# Patient Record
Sex: FEMALE | Race: ASIAN | NOT HISPANIC OR LATINO | URBAN - METROPOLITAN AREA
[De-identification: names, ages, dates, MRNs, and addresses within clinical notes are randomized per-mention and may not be internally consistent; named-entity substitution may affect disease eponyms.]

---

## 2020-10-02 ENCOUNTER — INPATIENT (INPATIENT)
Facility: HOSPITAL | Age: 50
LOS: 2 days | Discharge: ANOTHER HOSPITAL | End: 2020-10-05
Attending: HOSPITALIST | Admitting: HOSPITALIST
Payer: COMMERCIAL

## 2020-10-02 VITALS
TEMPERATURE: 98 F | OXYGEN SATURATION: 100 % | HEART RATE: 102 BPM | DIASTOLIC BLOOD PRESSURE: 81 MMHG | SYSTOLIC BLOOD PRESSURE: 124 MMHG | RESPIRATION RATE: 16 BRPM

## 2020-10-02 DIAGNOSIS — F32.2 MAJOR DEPRESSIVE DISORDER, SINGLE EPISODE, SEVERE WITHOUT PSYCHOTIC FEATURES: ICD-10-CM

## 2020-10-02 DIAGNOSIS — F43.20 ADJUSTMENT DISORDER, UNSPECIFIED: ICD-10-CM

## 2020-10-02 DIAGNOSIS — Z29.9 ENCOUNTER FOR PROPHYLACTIC MEASURES, UNSPECIFIED: ICD-10-CM

## 2020-10-02 DIAGNOSIS — T71.162A ASPHYXIATION DUE TO HANGING, INTENTIONAL SELF-HARM, INITIAL ENCOUNTER: ICD-10-CM

## 2020-10-02 LAB
ALBUMIN SERPL ELPH-MCNC: 4.8 G/DL — SIGNIFICANT CHANGE UP (ref 3.3–5)
ALP SERPL-CCNC: 71 U/L — SIGNIFICANT CHANGE UP (ref 40–120)
ALT FLD-CCNC: 41 U/L — HIGH (ref 4–33)
AMPHET UR-MCNC: NEGATIVE — SIGNIFICANT CHANGE UP
ANION GAP SERPL CALC-SCNC: 19 MMO/L — HIGH (ref 7–14)
APAP SERPL-MCNC: < 15 UG/ML — LOW (ref 15–25)
APPEARANCE UR: CLEAR — SIGNIFICANT CHANGE UP
APTT BLD: 26.1 SEC — LOW (ref 27–36.3)
AST SERPL-CCNC: 43 U/L — HIGH (ref 4–32)
BACTERIA # UR AUTO: NEGATIVE — SIGNIFICANT CHANGE UP
BARBITURATES UR SCN-MCNC: NEGATIVE — SIGNIFICANT CHANGE UP
BASOPHILS # BLD AUTO: 0.04 K/UL — SIGNIFICANT CHANGE UP (ref 0–0.2)
BASOPHILS NFR BLD AUTO: 0.6 % — SIGNIFICANT CHANGE UP (ref 0–2)
BENZODIAZ UR-MCNC: NEGATIVE — SIGNIFICANT CHANGE UP
BILIRUB SERPL-MCNC: 0.6 MG/DL — SIGNIFICANT CHANGE UP (ref 0.2–1.2)
BILIRUB UR-MCNC: NEGATIVE — SIGNIFICANT CHANGE UP
BLD GP AB SCN SERPL QL: NEGATIVE — SIGNIFICANT CHANGE UP
BLOOD UR QL VISUAL: NEGATIVE — SIGNIFICANT CHANGE UP
BUN SERPL-MCNC: 21 MG/DL — SIGNIFICANT CHANGE UP (ref 7–23)
CALCIUM SERPL-MCNC: 9.7 MG/DL — SIGNIFICANT CHANGE UP (ref 8.4–10.5)
CANNABINOIDS UR-MCNC: NEGATIVE — SIGNIFICANT CHANGE UP
CHLORIDE SERPL-SCNC: 98 MMOL/L — SIGNIFICANT CHANGE UP (ref 98–107)
CO2 SERPL-SCNC: 23 MMOL/L — SIGNIFICANT CHANGE UP (ref 22–31)
COCAINE METAB.OTHER UR-MCNC: NEGATIVE — SIGNIFICANT CHANGE UP
COLOR SPEC: SIGNIFICANT CHANGE UP
CREAT SERPL-MCNC: 0.7 MG/DL — SIGNIFICANT CHANGE UP (ref 0.5–1.3)
EOSINOPHIL # BLD AUTO: 0.07 K/UL — SIGNIFICANT CHANGE UP (ref 0–0.5)
EOSINOPHIL NFR BLD AUTO: 1 % — SIGNIFICANT CHANGE UP (ref 0–6)
ETHANOL BLD-MCNC: < 10 MG/DL — SIGNIFICANT CHANGE UP
GLUCOSE SERPL-MCNC: 150 MG/DL — HIGH (ref 70–99)
GLUCOSE UR-MCNC: NEGATIVE — SIGNIFICANT CHANGE UP
HCG SERPL-ACNC: < 5 MIU/ML — SIGNIFICANT CHANGE UP
HCT VFR BLD CALC: 48 % — HIGH (ref 34.5–45)
HGB BLD-MCNC: 15.3 G/DL — SIGNIFICANT CHANGE UP (ref 11.5–15.5)
HYALINE CASTS # UR AUTO: NEGATIVE — SIGNIFICANT CHANGE UP
IMM GRANULOCYTES NFR BLD AUTO: 0.3 % — SIGNIFICANT CHANGE UP (ref 0–1.5)
INR BLD: 0.99 — SIGNIFICANT CHANGE UP (ref 0.88–1.16)
KETONES UR-MCNC: NEGATIVE — SIGNIFICANT CHANGE UP
LEUKOCYTE ESTERASE UR-ACNC: NEGATIVE — SIGNIFICANT CHANGE UP
LYMPHOCYTES # BLD AUTO: 1.87 K/UL — SIGNIFICANT CHANGE UP (ref 1–3.3)
LYMPHOCYTES # BLD AUTO: 26.9 % — SIGNIFICANT CHANGE UP (ref 13–44)
MCHC RBC-ENTMCNC: 30.6 PG — SIGNIFICANT CHANGE UP (ref 27–34)
MCHC RBC-ENTMCNC: 31.9 % — LOW (ref 32–36)
MCV RBC AUTO: 96 FL — SIGNIFICANT CHANGE UP (ref 80–100)
METHADONE UR-MCNC: NEGATIVE — SIGNIFICANT CHANGE UP
MONOCYTES # BLD AUTO: 0.26 K/UL — SIGNIFICANT CHANGE UP (ref 0–0.9)
MONOCYTES NFR BLD AUTO: 3.7 % — SIGNIFICANT CHANGE UP (ref 2–14)
NEUTROPHILS # BLD AUTO: 4.7 K/UL — SIGNIFICANT CHANGE UP (ref 1.8–7.4)
NEUTROPHILS NFR BLD AUTO: 67.5 % — SIGNIFICANT CHANGE UP (ref 43–77)
NITRITE UR-MCNC: NEGATIVE — SIGNIFICANT CHANGE UP
NRBC # FLD: 0 K/UL — SIGNIFICANT CHANGE UP (ref 0–0)
OPIATES UR-MCNC: NEGATIVE — SIGNIFICANT CHANGE UP
OXYCODONE UR-MCNC: NEGATIVE — SIGNIFICANT CHANGE UP
PCP UR-MCNC: NEGATIVE — SIGNIFICANT CHANGE UP
PH UR: 6.5 — SIGNIFICANT CHANGE UP (ref 5–8)
PLATELET # BLD AUTO: 327 K/UL — SIGNIFICANT CHANGE UP (ref 150–400)
PMV BLD: 10.3 FL — SIGNIFICANT CHANGE UP (ref 7–13)
POTASSIUM SERPL-MCNC: 4.2 MMOL/L — SIGNIFICANT CHANGE UP (ref 3.5–5.3)
POTASSIUM SERPL-SCNC: 4.2 MMOL/L — SIGNIFICANT CHANGE UP (ref 3.5–5.3)
PROT SERPL-MCNC: 7.2 G/DL — SIGNIFICANT CHANGE UP (ref 6–8.3)
PROT UR-MCNC: 20 — SIGNIFICANT CHANGE UP
PROTHROM AB SERPL-ACNC: 11.3 SEC — SIGNIFICANT CHANGE UP (ref 10.6–13.6)
RBC # BLD: 5 M/UL — SIGNIFICANT CHANGE UP (ref 3.8–5.2)
RBC # FLD: 11.7 % — SIGNIFICANT CHANGE UP (ref 10.3–14.5)
RBC CASTS # UR COMP ASSIST: SIGNIFICANT CHANGE UP (ref 0–?)
RH IG SCN BLD-IMP: POSITIVE — SIGNIFICANT CHANGE UP
SALICYLATES SERPL-MCNC: < 5 MG/DL — LOW (ref 15–30)
SARS-COV-2 RNA SPEC QL NAA+PROBE: SIGNIFICANT CHANGE UP
SODIUM SERPL-SCNC: 140 MMOL/L — SIGNIFICANT CHANGE UP (ref 135–145)
SP GR SPEC: > 1.04 — HIGH (ref 1–1.04)
SQUAMOUS # UR AUTO: SIGNIFICANT CHANGE UP
T4 AB SER-ACNC: 7.5 UG/DL — SIGNIFICANT CHANGE UP (ref 5.1–13)
TSH SERPL-MCNC: 5.73 UIU/ML — HIGH (ref 0.27–4.2)
UROBILINOGEN FLD QL: NORMAL — SIGNIFICANT CHANGE UP
WBC # BLD: 6.96 K/UL — SIGNIFICANT CHANGE UP (ref 3.8–10.5)
WBC # FLD AUTO: 6.96 K/UL — SIGNIFICANT CHANGE UP (ref 3.8–10.5)
WBC UR QL: SIGNIFICANT CHANGE UP (ref 0–?)

## 2020-10-02 PROCEDURE — 70548 MR ANGIOGRAPHY NECK W/DYE: CPT | Mod: 26

## 2020-10-02 PROCEDURE — 70496 CT ANGIOGRAPHY HEAD: CPT | Mod: 26

## 2020-10-02 PROCEDURE — 90792 PSYCH DIAG EVAL W/MED SRVCS: CPT

## 2020-10-02 PROCEDURE — 70551 MRI BRAIN STEM W/O DYE: CPT | Mod: 26

## 2020-10-02 PROCEDURE — 70498 CT ANGIOGRAPHY NECK: CPT | Mod: 26

## 2020-10-02 PROCEDURE — 99222 1ST HOSP IP/OBS MODERATE 55: CPT

## 2020-10-02 PROCEDURE — 99285 EMERGENCY DEPT VISIT HI MDM: CPT

## 2020-10-02 RX ORDER — SODIUM CHLORIDE 9 MG/ML
1000 INJECTION INTRAMUSCULAR; INTRAVENOUS; SUBCUTANEOUS ONCE
Refills: 0 | Status: COMPLETED | OUTPATIENT
Start: 2020-10-02 | End: 2020-10-02

## 2020-10-02 RX ORDER — INFLUENZA VIRUS VACCINE 15; 15; 15; 15 UG/.5ML; UG/.5ML; UG/.5ML; UG/.5ML
0.5 SUSPENSION INTRAMUSCULAR ONCE
Refills: 0 | Status: DISCONTINUED | OUTPATIENT
Start: 2020-10-02 | End: 2020-10-05

## 2020-10-02 RX ORDER — ASPIRIN/CALCIUM CARB/MAGNESIUM 324 MG
324 TABLET ORAL ONCE
Refills: 0 | Status: COMPLETED | OUTPATIENT
Start: 2020-10-02 | End: 2020-10-02

## 2020-10-02 RX ADMIN — Medication 324 MILLIGRAM(S): at 11:14

## 2020-10-02 RX ADMIN — SODIUM CHLORIDE 1000 MILLILITER(S): 9 INJECTION INTRAMUSCULAR; INTRAVENOUS; SUBCUTANEOUS at 13:06

## 2020-10-02 NOTE — CONSULT NOTE ADULT - ATTENDING COMMENTS
Pt is 48 yo woman with question of dissection of blood vessel following suicide attempt.  Imaging shows no evidecne of dissection of carotid or vertebral arteries at this time.  Some white matter disease of unclear etiology but no acute change.  OK for discharge and outpatient prn follow up.

## 2020-10-02 NOTE — BEHAVIORAL HEALTH ASSESSMENT NOTE - HPI (INCLUDE ILLNESS QUALITY, SEVERITY, DURATION, TIMING, CONTEXT, MODIFYING FACTORS, ASSOCIATED SIGNS AND SYMPTOMS)
49 F, no known PMHx, no known PPHx, no prior SAs, no prior suicidal ideation, employed as , domiciled with  and kids, BIBEMS after suicide attempt by hanging self with rope. Psychiatry consulted for suicide attempt.    Patient states that over the last few weeks, she has lost "passion" for life and she has felt less motivated to do work around the house. She also endorses poor energy and poor sleep. She states that she and her family have been struggling very hard during the pandemic and have lost a substantial portion of their income. She states that early today morning, she went outside and thought about hanging herself with a rope. She states that this was the first time she ever thought about suicide. She states that she tied a rope around her neck and then stepped onto and off of a ladder continuously for about 30-40 minutes but was too scared to jump. She states that she then doesn't remember what happened but she fell asleep or lost consciousness. She states that she currently regrets the suicide attempt and is very embarrassed and feels that she has betrayed her family. She is concerned that if she is hospitalized psychiatrically she will lose her job because somebody will take her place.    Patient denies any history of psychotic symptoms including AH, VH, and paranoia.    Spoke to patient's  (). He states that their family has been struggling a lot since he lost his business and they have had trouble making payments on their home. He states that he currently works part time at a liquor store and his wife has been very anxious about him. He states that for the last few weeks, she has lost interest in activities and they have been struggling to get her to eat. He states that he encouraged her to see a doctor to help her with her mood but she always refused. He never thought that she would attempt suicide. He said that he woke up at 4 AM and his wife was not in the bedroom. He looked all around the house and was unable to find her so went into the backyard and found her hanging. He says the rope was flexible so it did not seem very tight but she was unconscious. He called for his daughter and they took her down and carried her into the living room. He states that he shook her for a while until she woke up. They called the ambulance and took her to the hospital. He states that he is very concerned about her and he understands that she needs to stay in the hospital for a while so she can be safe. He reached out to her work place and at this time has gotten her excused through Monday.

## 2020-10-02 NOTE — CONSULT NOTE ADULT - ASSESSMENT
49year old female s/p suicide attempt by hanging. CTA shows irregularity of vertebral artery concerning for focal dissection

## 2020-10-02 NOTE — ED PROVIDER NOTE - ATTENDING CONTRIBUTION TO CARE
Urdu INTER #[id number] yesy  HPI: 48 yo F with no reported Past Medical History or psych history that was brought in by EMS after suicide attempt. collateral obtained from pts daughter and  at bedside, pts daughter left for full interview. reports family has been under financial hardship due to pandemic. last night  awoke unable to find wife in house. found pt in backyard hanging with jump rope, unconscious. pt has amnesia regarding events, states " I did a bad thing". pt denies previous hx of si attempts or self harm. pt denies using drugs or etoh. pt denies neck pain, resp distress, cp, sob, ha, f/c, cough, congestion.  EXAM: crying, consolable, speaking full sentences, no resp distress, no airway compromise, no drooling, neck supple with ligature shakira anterior but thyroid midline, no open wounds, no expanding hematoma, no bruits, chest wall stable, abd soft nontender, moving all 4 ext with normal pulses, no other skin exam signs of trauma.   MDM: pt with no reported Past Medical History or Psych history that was brought in by EMS from home with family at bedside that has attempt of suicide by hanging. Per  pt was unconscious but pt now awake and alert, admitting to SI due to financial difficulties due to pandemic. No history of SI and no pills found by EMS and denies drinking. Will need to monitor for airway issues but highly unlikely as pt has no complaints now and exam shows ligature marks but no airway issues. Will require labs and CT and medical clearance and psych consult. German INTER #[id number] yesy  HPI: 48 yo F with no reported Past Medical History or psych history that was brought in by EMS after suicide attempt. collateral obtained from pts daughter and  at bedside, pts daughter left for full interview. reports family has been under financial hardship due to pandemic. last night  awoke unable to find wife in house. found pt in backyard hanging with jump rope, unconscious. pt has amnesia regarding events, states " I did a bad thing". pt denies previous hx of si attempts or self harm. pt denies using drugs or etoh. pt denies neck pain, resp distress, cp, sob, ha, f/c, cough, congestion.  EXAM: crying, consolable, speaking full sentences, no resp distress, no airway compromise, no drooling, neck supple with ligature shakira anterior but thyroid midline, no open wounds, no expanding hematoma, no bruits, chest wall stable, abd soft nontender, moving all 4 ext with normal pulses, no other skin exam signs of trauma.   MDM: pt with no reported Past Medical History or Psych history that was brought in by EMS from home with family at bedside that has attempt of suicide by hanging. Per  pt was unconscious but pt now awake and alert, admitting to SI due to financial difficulties due to pandemic. No history of SI and no pills found by EMS and denies drinking. Will need to monitor for airway issues but highly unlikely as pt has no complaints now and exam shows ligature marks but no airway issues. Will require labs and CT and medical clearance and psych consult. will place on 1:1 CO while in ED pending medical clearance.

## 2020-10-02 NOTE — BEHAVIORAL HEALTH ASSESSMENT NOTE - NSBHCHARTREVIEWIMAGING_PSY_A_CORE FT
patient had CT angio head and neck  IMPRESSION: Mild irregularity of the right V3 segment raising the possibility of a focal dissection. MR angiography of the neck with fat-suppressed T1-weighted axial images is advised for further evaluation.    No other occlusion, significant stenosis or vascular abnormality identified.    Findings were discussed with Dr. Robbins on 10/2/2020 10:36 AM with read back.

## 2020-10-02 NOTE — BEHAVIORAL HEALTH ASSESSMENT NOTE - OTHER
patient repeatedly discusses guilt and shame regarding incident, as well as concerns about losing job financial concerns

## 2020-10-02 NOTE — CONSULT NOTE ADULT - SUBJECTIVE AND OBJECTIVE BOX
Neurology Consult Note    Maori  ID# [id number]    HPI: 50 y/o female with no reported medical history presents to the [facility] ED for suicide attempt. Patient reports that around 5:30am this morning she sat outside her home on a ladder with a rope around her neck which was attached to her windowsil. Patient states she was going back and forth about going through with her suicide attempt when she reports she fell asleep and woke up to her  finding her. Patient reports that since the COVID19 pandemic her family has been under financial hardship. She has also lost interest in doing leisure activities such as trying different coffees and socially consuming alcohol. Hence, why she tried to commit suicide. In the ED, patient had CTA H/N done which demonstrated focal irregularity of the R V3 segment possibly concerning for dissection. Neurology consulted for this reason.   On interview, patient denies any headache, blurry vision, nausea, vomiting, unilateral weakness, problems ambulating.     (Stroke only)  NIHSS: 0  MRS: 0  ICH:     REVIEW OF SYSTEMS  A 10-system ROS was performed and is negative except for those items noted above and/or in the HPI.    PAST MEDICAL & SURGICAL HISTORY:    FAMILY HISTORY:    SOCIAL HISTORY:   T/E/D:   Occupation:   Lives with:     MEDICATIONS (HOME):  Home Medications:    MEDICATIONS  (STANDING):    MEDICATIONS  (PRN):    ALLERGIES/INTOLERANCES:  Allergies  No Known Allergies    Intolerances    VITALS & EXAMINATION:  Vital Signs Last 24 Hrs  T(C): 36.7 (02 Oct 2020 08:57), Max: 36.7 (02 Oct 2020 05:36)  T(F): 98 (02 Oct 2020 08:57), Max: 98 (02 Oct 2020 05:36)  HR: 84 (02 Oct 2020 08:57) (80 - 102)  BP: 99/71 (02 Oct 2020 08:57) (99/71 - 124/81)  BP(mean): --  RR: 18 (02 Oct 2020 08:57) (16 - 18)  SpO2: 99% (02 Oct 2020 08:57) (99% - 100%)    General:  Constitutional: Female, appears stated age, in no apparent distress including pain  Head: Normocephalic & atraumatic.  Extremities: No cyanosis, clubbing, or edema.  Skin: No rashes, bruising, or discoloration.    Neurological (>12):  MS: Awake, alert, oriented to person, place, situation, time. Normal affect. Follows all commands.    Language: Speech is clear, fluent with good repetition & comprehension (able to name objects___)    CNs: VFF. EOMI no nystagmus. V1-3 intact to LT, well developed masseter muscles b/l. No facial asymmetry b/l. Gag reflex deferred. Tongue midline, normal movements, no atrophy.    Fundoscopic: Not visualized.     Motor: Normal muscle bulk & tone. No noticeable tremor. No pronator drift.              Deltoid	Biceps	Triceps	   R	5	5	5	5 	  L	5	5	5	5    	H-Flex	K-Flex	K-Ext	D-Flex	P-Flex  R	5	5	5	5	5 	   L	5	5	5	5	5	     Sensation: Intact to LT b/l throughout.     Cortical: Extinction on DSS (neglect): none    Coordination: intact rapid-alt movements. No dysmetria to FTN/HTS    Gait: Not assessed.     LABORATORY:  CBC                       15.3   6.96  )-----------( 327      ( 02 Oct 2020 05:50 )             48.0     Chem 10-02    140  |  98  |  21  ----------------------------<  150<H>  4.2   |  23  |  0.70    Ca    9.7      02 Oct 2020 05:50    TPro  7.2  /  Alb  4.8  /  TBili  0.6  /  DBili  x   /  AST  43<H>  /  ALT  41<H>  /  AlkPhos  71  10-02    LFTs LIVER FUNCTIONS - ( 02 Oct 2020 05:50 )  Alb: 4.8 g/dL / Pro: 7.2 g/dL / ALK PHOS: 71 u/L / ALT: 41 u/L / AST: 43 u/L / GGT: x           Coagulopathy PT/INR - ( 02 Oct 2020 10:45 )   PT: 11.3 SEC;   INR: 0.99          PTT - ( 02 Oct 2020 10:45 )  PTT:26.1 SEC  Lipid Panel   A1c   Cardiac enzymes     U/A Urinalysis Basic - ( 02 Oct 2020 08:50 )    Color: LIGHT YELLOW / Appearance: CLEAR / SG: > 1.040 / pH: 6.5  Gluc: NEGATIVE / Ketone: NEGATIVE  / Bili: NEGATIVE / Urobili: NORMAL   Blood: NEGATIVE / Protein: 20 / Nitrite: NEGATIVE   Leuk Esterase: NEGATIVE / RBC: 0-2 / WBC 0-2   Sq Epi: OCC / Non Sq Epi: x / Bacteria: NEGATIVE      CSF  Immunological  Other    STUDIES & IMAGING:  Studies (EKG, EEG, EMG, etc):     Radiology (XR, CT, MR, U/S, TTE/ROXI):  < from: CT Angio Head w/ IV Cont (10.02.20 @ 08:40) >  IMPRESSION: Mild irregularity of the right V3 segment raising the possibility of a focal dissection. MR angiography of the neck with fat-suppressed T1-weighted axial images is advised for further evaluation.  No other occlusion, significant stenosis or vascular abnormality identified.  < end of copied text >

## 2020-10-02 NOTE — ED ADULT NURSE REASSESSMENT NOTE - NS ED NURSE REASSESS COMMENT FT1
Pt a&ox3, calm and cooperative, denies any medical discomfort at the time, denies SI/HI and auditory hallucinations, CT result pending, PCA at bedside for CO for safety. respirations even/unlabored, nad noted, red shakira noted to neck. will continue to montor

## 2020-10-02 NOTE — ED PROVIDER NOTE - PROGRESS NOTE DETAILS
[phone] janelle (daughter)  [phone]  Called by radiology with concern for vertebral artery irregularity c/w possible dissection. Pt remains without neuro deficit and complaint. Neurology and neurosurgery consulted and recommending MRA, pt to be admitted to medicine.

## 2020-10-02 NOTE — BEHAVIORAL HEALTH ASSESSMENT NOTE - PRIMARY DX
Adjustment disorder Current severe episode of major depressive disorder without psychotic features without prior episode

## 2020-10-02 NOTE — ED PROVIDER NOTE - CLINICAL SUMMARY MEDICAL DECISION MAKING FREE TEXT BOX
cassie pgy3: 50 yo f bibems after hanging attempt. pt found unconscious in backyard w/ noose around neck. pt arrives hds, mildly confused and tearful. no s/s airway compromise upon initial eval. concern for neck trauma and/or anoxic brain injury. pt appears clinically sober. will medically eval and obtain psych consult.

## 2020-10-02 NOTE — ED ADULT NURSE NOTE - NSIMPLEMENTINTERV_GEN_ALL_ED
Implemented All Universal Safety Interventions:  Cohoes to call system. Call bell, personal items and telephone within reach. Instruct patient to call for assistance. Room bathroom lighting operational. Non-slip footwear when patient is off stretcher. Physically safe environment: no spills, clutter or unnecessary equipment. Stretcher in lowest position, wheels locked, appropriate side rails in place.

## 2020-10-02 NOTE — H&P ADULT - ASSESSMENT
Ms Lcuero is a year old female with no known PMH who is presenting to the hospital after being found this morning hanging unconscious in her back yard from a jump rope with CT findings concerning for possible vertebral artery dissection.

## 2020-10-02 NOTE — ED PROVIDER NOTE - NS ED ROS FT
GENERAL: No fever or chills, //             EYES: no change in vision, //             HEENT: no trouble swallowing or speaking, //             CARDIAC: no chest pain, //              PULMONARY: no cough or SOB, //             GI: no abdominal pain, no nausea or no vomiting, no diarrhea or constipation, //             : No changes in urination,  //            SKIN: no rashes,  //            NEURO: no headache,  //             MSK: No joint pain otherwise as HPI or negative. ~Dru Cornelius DO PGY3

## 2020-10-02 NOTE — ED ADULT NURSE NOTE - OBJECTIVE STATEMENT
pt received to room trauma b, for suicide attempt by hanging. pt found unresponsive in backyard by  with jump rope around neck. pt now awake and alert, tearful, anxious, states she feels disoriented.  ligature shakira noted around neck. using  pt states she has been depressed and feels like she is not needed, works as a  and has been struggling financially since economic crisis due to pandemic. no hx depression, meds, SI, SA. denies drugs or alcohol use. does not recall recent events after hanging. 20G IV placed right AC by jose RN, labs drawn and sent. sinus tachycardia on cardiac monitor. O2 sat 100% on room air. family permitted at bedside for short time. belongings with family. PCA at bedside for 1:1.

## 2020-10-02 NOTE — BEHAVIORAL HEALTH ASSESSMENT NOTE - NSBHCONSULTRECOMMENDOTHER_PSY_A_CORE FT
If medically clear soon, patient will require inpatient psychiatric admission for initiation of psychiatric treatment, symptom stabilization, and monitoring for safety

## 2020-10-02 NOTE — CONSULT NOTE ADULT - SUBJECTIVE AND OBJECTIVE BOX
HPI:  50 y/o female with no reported medical history presents to the [facility] ED for suicide attempt. Patient reports that around 5:30am this morning she sat outside her home on a ladder with a rope around her neck which was attached to her windowsil. Patient states she was going back and forth about going through with her suicide attempt when she reports she fell asleep and woke up to her  finding her. Patient reports that since the COVID19 pandemic her family has been under financial hardship. She has also lost interest in doing leisure activities such as trying different coffees and socially consuming alcohol. Hence, why she tried to commit suicide. In the ED, patient had CTA H/N done which demonstrated focal irregularity of the R V3 segment possibly concerning for dissection. Neurology consulted for this reason.   On interview, patient denies any headache, blurry vision, nausea, vomiting, unilateral weakness, problems ambulating.     Vital Signs Last 24 Hrs  T(C): 36.7 (02 Oct 2020 08:57), Max: 36.7 (02 Oct 2020 05:36)  T(F): 98 (02 Oct 2020 08:57), Max: 98 (02 Oct 2020 05:36)  HR: 84 (02 Oct 2020 08:57) (80 - 102)  BP: 99/71 (02 Oct 2020 08:57) (99/71 - 124/81)  BP(mean): --  RR: 18 (02 Oct 2020 08:57) (16 - 18)  SpO2: 99% (02 Oct 2020 08:57) (99% - 100%)    PHYSICAL EXAM:  Awake, Alert, Oriented x 3  PERRL, EOMI  MOSCOSO x 4  No drift      LABS:                          15.3   6.96  )-----------( 327      ( 02 Oct 2020 05:50 )             48.0     10-02    140  |  98  |  21  ----------------------------<  150<H>  4.2   |  23  |  0.70    Ca    9.7      02 Oct 2020 05:50    TPro  7.2  /  Alb  4.8  /  TBili  0.6  /  DBili  x   /  AST  43<H>  /  ALT  41<H>  /  AlkPhos  71  10-02    PT/INR - ( 02 Oct 2020 10:45 )   PT: 11.3 SEC;   INR: 0.99          PTT - ( 02 Oct 2020 10:45 )  PTT:26.1 SEC  Urinalysis Basic - ( 02 Oct 2020 08:50 )    Color: LIGHT YELLOW / Appearance: CLEAR / SG: > 1.040 / pH: 6.5  Gluc: NEGATIVE / Ketone: NEGATIVE  / Bili: NEGATIVE / Urobili: NORMAL   Blood: NEGATIVE / Protein: 20 / Nitrite: NEGATIVE   Leuk Esterase: NEGATIVE / RBC: 0-2 / WBC 0-2   Sq Epi: OCC / Non Sq Epi: x / Bacteria: NEGATIVE        RADIOLOGY & ADDITIONAL STUDIES:  < from: CT Angio Neck w/ IV Cont (10.02.20 @ 08:40) >    IMPRESSION: Mild irregularity of the right V3 segment raising the possibility of a focal dissection. MR angiography of the neck with fat-suppressed T1-weighted axial images is advised for further evaluation.    < end of copied text >

## 2020-10-02 NOTE — ED ADULT NURSE NOTE - CHIEF COMPLAINT QUOTE
Pt. was found in the backyard by family with a jump rope around neck that was loose.  Pt. was awake and alert with found by ems.  As per daughter her mom made disturbing comment yesterday stating "take care of your sister" Pt. appears very anxious, crying in triage and noted with shakira around her neck. not answering questions in triage. Pt. speaks Telugu.

## 2020-10-02 NOTE — ED PROVIDER NOTE - PHYSICAL EXAMINATION
General: well appearing, interactive, well nourished, no apparent distress, ncat  HEENT: EOMI, PERRLA, normal mucosa, normal oropharynx, no lesions on the lips or on oral mucosa, normal external ear  Neck: supple, no lymphadenopathy, full range of motion, no nuchal rigidity  CV: RRR, normal S1 and S2 with no murmur, capillary refill less than two seconds  Resp: lungs CTA b/l, good aeration bilaterally, symmetric chest wall   Abd: non-distended, soft, non-tender  : no CVA tenderness  MSK: full range of motion, no cyanosis, no edema, no clubbing, no immobility  Neuro: CN II-XII grossly intact, muscle strength 5/5 in all extremities  Skin: linear erythema horizontal over mid neck

## 2020-10-02 NOTE — BEHAVIORAL HEALTH ASSESSMENT NOTE - RISK ASSESSMENT
Moderate Acute Suicide Risk Moderate acute suicide risk. Risk factors include recent serious suicide attempt, impulsivity, anxiety, insomnia, guilt and shame, financial stressors, housing stressors, concerns about employment. Protective factors include denies current suicidal ideation, intent, or plan, expresses extreme regret over suicide attempt, stable family situation.

## 2020-10-02 NOTE — ED PROVIDER NOTE - OBJECTIVE STATEMENT
#[id number] yesy    50 yo f no reported pmh, bibems after suicide attempt. collateral obtained from pts daughter bedside, pts daughter left for full interview. reports family has been under financial hardship due to pandemic. last night  awoke unable to find wife in house. found pt in backyard hanging with jump rope, unconscious. pt has amnesia regarding events, states " I did a bad thing". pt denies previous hx of si attempts or self harm. pt denies using drugs or etoh. pt denies neck pain, cp, sob, ha, f/c, cough, congestion, ha. #[id number] [email or web address]    50 yo f no reported pmh, bibems after suicide attempt. collateral obtained from pts daughter bedside, pts daughter left for full interview. reports family has been under financial hardship due to pandemic. last night  awoke unable to find wife in house. found pt in backyard hanging with jump rope, unconscious. pt has amnesia regarding events, states " I did a bad thing". pt denies previous hx of si attempts or self harm. pt denies using drugs or etoh. pt denies neck pain, cp, sob, ha, f/c, cough, congestion, wong.    [phone] janelle (daughter)  [phone]

## 2020-10-02 NOTE — CONSULT NOTE ADULT - ASSESSMENT
50 y/o female with no reported medical history presents to the [facility] ED for suicide attempt. Patient reports that around 5:30am this morning she sat outside her home on a ladder with a rope around her neck which was attached to her windowsil. Patient states she was going back and forth about going through with her suicide attempt when she reports she fell asleep and woke up to her  finding her. Patient reports that since the COVID19 pandemic her family has been under financial hardship. She has also lost interest in doing leisure activities such as trying different coffees and socially consuming alcohol. Hence, why she tried to commit suicide. In the ED, patient had CTA H/N done which demonstrated focal irregularity of the R V3 segment possibly concerning for dissection. Neurology consulted for this reason.   On interview, patient denies any headache, blurry vision, nausea, vomiting, unilateral weakness, problems ambulating.     CTH without any abnormality to my eye  CTA Neck with focal irregularity of the R V3 segement  CTA Head without any aneurysms, stenosis, or LVO    Impression: Neurologically asymptomatic patient w/ finding of R V3 irregularity concerning for dissection possibly 2/2 suicide attempt; would require further clarification with imaging    Recommendations:   [] Start Aspirin 81mg PO QD  [] MRI brain w/o contrast  [] MRA Neck w/ contrast and T1 Fat suppression (attn: to the R V3 segement)  [] Psych evaluation  [] If no evidence of infarct on MRI and no evidence of dissection on MRA, can stop Aspirin    Plan relayed to ED team

## 2020-10-02 NOTE — BEHAVIORAL HEALTH ASSESSMENT NOTE - NSBHCONSULTPRIMARYDISCUSSYES_PSY_A_CORE FT
Discussed PRNs, no standing meds, and need for inpatient admission to psych if patient medically clear soon

## 2020-10-02 NOTE — BEHAVIORAL HEALTH ASSESSMENT NOTE - CASE SUMMARY
Chart reviewed. I personally saw and examined the patient with Dr. Kearney. In brief, patient provides somewhat of a convoluted history to her actual suicide attempt, though shows significant remorse of attempt. She admits that she has been feeling more down of late and worried about finances. She denies SI now. She does not want to go to inpatient psychiatry and does not seem to understand the seriousness of her attempt. Her affect is anxious. Her thought process is at times circumferential, however mostly linear. She shows good attention when asked to do months the year backwards and her cognition is intact. The collateral from her  is concerning for worsening depression with now a suicide attempt.  Given this situation- when medically cleared, she will warrant inpatient psychiatry as the least restrictive means where further observation and treatment can be done safely. Please continue 1:1 for SA, ensure no sharp objects or cords near her given her impulsivity. Plan per above. Will follow. Call with questions.

## 2020-10-02 NOTE — H&P ADULT - NSHPPHYSICALEXAM_GEN_ALL_CORE
PHYSICAL EXAM:  Vital Signs Last 24 Hrs  T(C): 36.4 (10-02-20 @ 13:29)  T(F): 97.6 (10-02-20 @ 13:29), Max: 98 (10-02-20 @ 05:36)  HR: 67 (10-02-20 @ 13:29) (67 - 102)  BP: 116/61 (10-02-20 @ 13:29)  BP(mean): --  RR: 17 (10-02-20 @ 13:29) (16 - 18)  SpO2: 100% (10-02-20 @ 13:29) (99% - 100%)  Wt(kg): --    Constitutional: anxious appearing young female in bed, sitting up, appears well groomed, NAD, awake and alert  EYES: PERRLA, EOMI, normal sclera  HEENT:  nares clear, no tonsillar exudates, faint circumferential ecchymosis of mid neck   Neck: soft and supple , no thyromegaly   Respiratory: no respiratory distress, breath sounds are clear bilaterally. No wheezing, rales or rhonchi  Cardiovascular: S1 and S2, regular rate and rhythm, no murmurs. peripheral pulses palpable x4  Gastrointestinal: Soft, nontender, nondistended. +BS  Extremities: No lower extremity edema, no calf tenderness, warm to touch  Neurological: CN II-XII in tact, 5/5 strength b/l upper and lower extremities.   Musculoskeletal: Normal ROM, no joint swelling.  Skin: No rashes, No erythema   Psych: Alert and Oriented x3, anxious affect, fidgeting throughout interview

## 2020-10-02 NOTE — BEHAVIORAL HEALTH ASSESSMENT NOTE - NSBHCHARTREVIEWVS_PSY_A_CORE FT
T(C): 36.4 (02 Oct 2020 13:29), Max: 36.7 (02 Oct 2020 05:36)  T(F): 97.6 (02 Oct 2020 13:29), Max: 98 (02 Oct 2020 05:36)  HR: 67 (02 Oct 2020 13:29) (67 - 102)  BP: 116/61 (02 Oct 2020 13:29) (99/71 - 124/81)  RR: 17 (02 Oct 2020 13:29) (16 - 18)  SpO2: 100% (02 Oct 2020 13:29) (99% - 100%)

## 2020-10-02 NOTE — H&P ADULT - PROBLEM SELECTOR PLAN 2
-CT angio neck showed mild irregularity of the right V3 segment raising the possibility of a focal dissection  -neuro and NS on board  - ASA  - MR head and MR neck +/- pending

## 2020-10-02 NOTE — ED ADULT TRIAGE NOTE - CHIEF COMPLAINT QUOTE
Pt. was found in the backyard by family with a jump rope around neck that was loose.  Pt. was awake and alert with found by ems.  As per daughter her mom made disturbing comment yesterday stating "take care of your sister" Pt. appears very anxious, crying in triage and noted with shakira around her neck. not answering questions in triage. Pt. speaks Kyrgyz.

## 2020-10-02 NOTE — H&P ADULT - NSHPREVIEWOFSYSTEMS_GEN_ALL_CORE
CONSTITUTIONAL: No fevers or chills  EYES/ENT: No vision changes or eye pain  NECK: No neck pain or stiffness.  RESPIRATORY: No shortness of breath. No cough, wheezing.  CARDIOVASCULAR: No chest pain or palpitations  GASTROINTESTINAL: No abdominal pain. No nausea, vomiting; No diarrhea or constipation.  GENITOURINARY: No dysuria or hematuria  NEUROLOGICAL: No numbness or weakness  ENDO: No polyuria or polydipsia.   SKIN: No itching, rashes.    All other review of systems is negative unless indicated above.

## 2020-10-02 NOTE — H&P ADULT - HISTORY OF PRESENT ILLNESS
Ms Lucero is a year old female with no known PMH who is presenting to the hospital after being found this morning hanging unconscious in her back yard from a jump rope. She was brought here by EMS and evaluated in the ED.  On initial presentation she was reportedly neurologically intact but mildly confused. CT head and neck were done and showed mild irregularity of the right V3 segment raising the possibility of a focal dissection. Neurology and and Neurosurgery were consulted in the ED and recommended ASA and MRI.     I attempted to ask patient about the series of events that led up to her presentation. Patient told me repeatedly that "I know I did something stupid" and that "it is my fault and only my fault". She states that she is so ashamed and already discussed it with three other providers and politely asked if she could not repeat. We spent the first 15 minutes of our conversation talking about the financial impact that this hospitalization will have on her and her family. She is very concerned about the fact that she is not currently carrying her insurance card and is concerned about the coverage that [employer] will provide.    Patient is actively denying any headaches, lightheadedness vision changes, neck pain, neck stiffness, decreased ROM of neck.

## 2020-10-02 NOTE — BEHAVIORAL HEALTH ASSESSMENT NOTE - SUMMARY
49 F, no known PMHx, no known PPHx, no prior SAs, no prior suicidal ideation, employed as , domiciled with  and kids, BIBEMS after suicide attempt by hanging self with rope. Psychiatry consulted for suicide attempt.     Patient endorses mood symptoms including anhedonia, depressed mood, poor sleep, poor energy.  states patient has had poor appetite. Patient had recent very serious suicide attempt by hanging; patient lost consciousness and was found by .

## 2020-10-03 LAB
ANION GAP SERPL CALC-SCNC: 12 MMO/L — SIGNIFICANT CHANGE UP (ref 7–14)
BUN SERPL-MCNC: 14 MG/DL — SIGNIFICANT CHANGE UP (ref 7–23)
CALCIUM SERPL-MCNC: 9.2 MG/DL — SIGNIFICANT CHANGE UP (ref 8.4–10.5)
CHLORIDE SERPL-SCNC: 103 MMOL/L — SIGNIFICANT CHANGE UP (ref 98–107)
CO2 SERPL-SCNC: 24 MMOL/L — SIGNIFICANT CHANGE UP (ref 22–31)
CREAT SERPL-MCNC: 0.55 MG/DL — SIGNIFICANT CHANGE UP (ref 0.5–1.3)
GLUCOSE SERPL-MCNC: 102 MG/DL — HIGH (ref 70–99)
HCT VFR BLD CALC: 41.5 % — SIGNIFICANT CHANGE UP (ref 34.5–45)
HGB BLD-MCNC: 13.6 G/DL — SIGNIFICANT CHANGE UP (ref 11.5–15.5)
MAGNESIUM SERPL-MCNC: 2.1 MG/DL — SIGNIFICANT CHANGE UP (ref 1.6–2.6)
MCHC RBC-ENTMCNC: 30.9 PG — SIGNIFICANT CHANGE UP (ref 27–34)
MCHC RBC-ENTMCNC: 32.8 % — SIGNIFICANT CHANGE UP (ref 32–36)
MCV RBC AUTO: 94.3 FL — SIGNIFICANT CHANGE UP (ref 80–100)
NRBC # FLD: 0 K/UL — SIGNIFICANT CHANGE UP (ref 0–0)
PHOSPHATE SERPL-MCNC: 3.6 MG/DL — SIGNIFICANT CHANGE UP (ref 2.5–4.5)
PLATELET # BLD AUTO: 284 K/UL — SIGNIFICANT CHANGE UP (ref 150–400)
PMV BLD: 10.7 FL — SIGNIFICANT CHANGE UP (ref 7–13)
POTASSIUM SERPL-MCNC: 4.1 MMOL/L — SIGNIFICANT CHANGE UP (ref 3.5–5.3)
POTASSIUM SERPL-SCNC: 4.1 MMOL/L — SIGNIFICANT CHANGE UP (ref 3.5–5.3)
RBC # BLD: 4.4 M/UL — SIGNIFICANT CHANGE UP (ref 3.8–5.2)
RBC # FLD: 11.9 % — SIGNIFICANT CHANGE UP (ref 10.3–14.5)
SODIUM SERPL-SCNC: 139 MMOL/L — SIGNIFICANT CHANGE UP (ref 135–145)
WBC # BLD: 7.32 K/UL — SIGNIFICANT CHANGE UP (ref 3.8–10.5)
WBC # FLD AUTO: 7.32 K/UL — SIGNIFICANT CHANGE UP (ref 3.8–10.5)

## 2020-10-03 PROCEDURE — 99253 IP/OBS CNSLTJ NEW/EST LOW 45: CPT

## 2020-10-03 PROCEDURE — 99232 SBSQ HOSP IP/OBS MODERATE 35: CPT

## 2020-10-03 PROCEDURE — 99233 SBSQ HOSP IP/OBS HIGH 50: CPT

## 2020-10-03 PROCEDURE — 99222 1ST HOSP IP/OBS MODERATE 55: CPT

## 2020-10-03 NOTE — PROGRESS NOTE BEHAVIORAL HEALTH - NSBHCHARTREVIEWLAB_PSY_A_CORE FT
13.6   7.32  )-----------( 284      ( 03 Oct 2020 07:10 )             41.5       10-03    139  |  103  |  14  ----------------------------<  102<H>  4.1   |  24  |  0.55    Ca    9.2      03 Oct 2020 07:10  Phos  3.6     10-03  Mg     2.1     10-03    TPro  7.2  /  Alb  4.8  /  TBili  0.6  /  DBili  x   /  AST  43<H>  /  ALT  41<H>  /  AlkPhos  71  10-02

## 2020-10-03 NOTE — PROGRESS NOTE ADULT - PROBLEM SELECTOR PLAN 1
- intentional, seems to be stemming from financial hardships   - psych rec appreciated, high risk for suicide, would need further assessment, possible inpatient psych.  - maintain 1:1

## 2020-10-03 NOTE — PROGRESS NOTE BEHAVIORAL HEALTH - NSBHFUPSUICINTERVALFT_PSY_A_CORE
pt had a suicide attempt yesterday where she tried to hang herself with a  jump rope and lost consciousness but was found by her  and transferred to the ED

## 2020-10-03 NOTE — CHART NOTE - NSCHARTNOTEFT_GEN_A_CORE
No vertebral artery dissection identified. Neurosurgery signing off, no need for follow up, reconsult prn        < from: MR Angio Neck w/ IV Cont (10.02.20 @ 16:37) >  MRA of the neck was formed using 2-D and 3-D time flight technique. Axial T1-weighted sequence with fat suppression was performed through the soft tissue neck. Gadolinium infusion MRA of the neck was performed.    The lateral ventricles have a normal configuration.    Tiny subcentimeter foci of T2 prolongation in the periventricular white matter region is identified. These findings could be related to migraine headaches though underlying areas of ischemia infection inflammation or demyelinating disease (less likely) cannot be entirely excluded. Clinical correlation is recommended. Continued close interval follow-up imaging can be done for further evaluation if clinically indicated.    Cavum septum pellucidum and vergae is seen.    There is no evidence acute hemorrhage mass or mass effect in the posterior fossa or supratentorial region.    Evaluation of the diffusion weighted sequence demonstrates no abnormal areas of restricted diffusion to suggest acute infarct.    There are no abnormal intra or extra axial collection seen.    The large vessels demonstrate normal flow voids.    The visualized sinuses mastoid and middle ear regions appear clear.    Both distal common carotid, proximal internal and external carotid arteries appear normal as well as both vertebral arteries. No significant stenosis or dissection is identified.    IMPRESSION: Subcentimeter foci of T2 prolongation in the periventricular white matter region as described above    Unremarkable MRA of the neck.

## 2020-10-03 NOTE — PROGRESS NOTE ADULT - PROBLEM SELECTOR PLAN 2
-CT angio neck showed mild irregularity of the right V3 segment raising the possibility of a focal dissection  - MRA brain negative for dissection.  -neuro and NS s/o  - d/c aspirin.

## 2020-10-03 NOTE — PROGRESS NOTE ADULT - ASSESSMENT
Ms Lucero is a 49 year old female with no known PMH who is presenting to the hospital after being found this morning hanging unconscious in her back yard from a jump rope with CT findings concerning for possible vertebral artery dissection. Further imaging with MRA which were negative for dissection. She is awaiting further psychiatric evaluation for dispo.

## 2020-10-03 NOTE — PROGRESS NOTE BEHAVIORAL HEALTH - NSBHFUPINTERVALHXFT_PSY_A_CORE
Pt was seen using [facility] [id number] in Persian language. Pt reports that she is feeling fine today. She slept well last night and is eating ok. She denies any neck pain or discomfort. Pt reported that she realizes her suicide attempt was "wrong" and wants to go home. Pt is worried that she will lose her job if she stays in the hospital. Pt admits that she has been feeling depressed which started around the time of the pandemic and has financial stressors. She denies planning out her suicide attempt yesterday and "does not know what happened." She also stated she wanted to abort the attempt retirement through but then lost consciousness. She denies any current SI I/P and is minimizing the severity of her attempt, saying she will be ok if she goes home.

## 2020-10-03 NOTE — PROGRESS NOTE BEHAVIORAL HEALTH - NSBHCHARTREVIEWVS_PSY_A_CORE FT
Vital Signs Last 24 Hrs  T(C): 37 (03 Oct 2020 07:32), Max: 37 (03 Oct 2020 07:32)  T(F): 98.6 (03 Oct 2020 07:32), Max: 98.6 (03 Oct 2020 07:32)  HR: 86 (03 Oct 2020 07:32) (67 - 97)  BP: 143/74 (03 Oct 2020 07:32) (116/61 - 143/74)  BP(mean): 88 (02 Oct 2020 23:30) (88 - 88)  RR: 18 (03 Oct 2020 07:32) (17 - 18)  SpO2: 98% (03 Oct 2020 07:32) (98% - 100%)

## 2020-10-03 NOTE — PROGRESS NOTE ADULT - SUBJECTIVE AND OBJECTIVE BOX
[facility] Division of Hospital Medicine  Marc Kong MD  In House Pager [phone]    Patient is a 49y old  Female who presents with a chief complaint of suicide attempt, vertebral artery dissection (02 Oct 2020 13:26)      SUBJECTIVE / OVERNIGHT EVENTS:  No overnight events. Patient seen and examined at bedside, labs and vitals reviewed.  patient was calm and have no acute complaints. she had MRA brain which was negative for dissection.   No fever, no chills, no SOB, no CP, no n/v/d, no abd pain, no dysuria      MEDICATIONS  (STANDING):  influenza   Vaccine 0.5 milliLiter(s) IntraMuscular once    MEDICATIONS  (PRN):  LORazepam     Tablet 1 milliGRAM(s) Oral every 4 hours PRN Anxiety  LORazepam   Injectable 1 milliGRAM(s) IV Push every 4 hours PRN Anxiety  LORazepam   Injectable 1 milliGRAM(s) IntraMuscular every 4 hours PRN Anxiety    CAPILLARY BLOOD GLUCOSE        I&O's Summary      PHYSICAL EXAM:  Vital Signs Last 24 Hrs  T(C): 37 (03 Oct 2020 07:32), Max: 37 (03 Oct 2020 07:32)  T(F): 98.6 (03 Oct 2020 07:32), Max: 98.6 (03 Oct 2020 07:32)  HR: 86 (03 Oct 2020 07:32) (67 - 97)  BP: 143/74 (03 Oct 2020 07:32) (116/61 - 143/74)  BP(mean): 88 (02 Oct 2020 23:30) (88 - 88)  RR: 18 (03 Oct 2020 07:32) (17 - 18)  SpO2: 98% (03 Oct 2020 07:32) (98% - 100%)  Gen: NAD; resting in bed.  Pulm: no respiratory distress; CTA b/l; no wheezing  Cards: RRR, nl S1/S2; no obvious murmurs  Abd: soft; NT on exam  Ext: no cyanosis; no edema  Skin: no rash; no cyanosis    LABS:                        13.6   7.32  )-----------( 284      ( 03 Oct 2020 07:10 )             41.5     10-03    139  |  103  |  14  ----------------------------<  102<H>  4.1   |  24  |  0.55    Ca    9.2      03 Oct 2020 07:10  Phos  3.6     10-03  Mg     2.1     10-03    TPro  7.2  /  Alb  4.8  /  TBili  0.6  /  DBili  x   /  AST  43<H>  /  ALT  41<H>  /  AlkPhos  71  10-02    PT/INR - ( 02 Oct 2020 10:45 )   PT: 11.3 SEC;   INR: 0.99          PTT - ( 02 Oct 2020 10:45 )  PTT:26.1 SEC      Urinalysis Basic - ( 02 Oct 2020 08:50 )    Color: LIGHT YELLOW / Appearance: CLEAR / SG: > 1.040 / pH: 6.5  Gluc: NEGATIVE / Ketone: NEGATIVE  / Bili: NEGATIVE / Urobili: NORMAL   Blood: NEGATIVE / Protein: 20 / Nitrite: NEGATIVE   Leuk Esterase: NEGATIVE / RBC: 0-2 / WBC 0-2   Sq Epi: OCC / Non Sq Epi: x / Bacteria: NEGATIVE          RADIOLOGY & ADDITIONAL TESTS:  Results Reviewed: Y  Imaging Personally Reviewed: Y  Electrocardiogram Personally Reviewed: Y    COORDINATION OF CARE:  Care Discussed with Consultants/Other Providers [Y/N]: Y  Prior or Outpatient Records Reviewed [Y/N]: Y

## 2020-10-04 PROCEDURE — 99233 SBSQ HOSP IP/OBS HIGH 50: CPT

## 2020-10-04 PROCEDURE — 99232 SBSQ HOSP IP/OBS MODERATE 35: CPT

## 2020-10-04 NOTE — PROGRESS NOTE ADULT - SUBJECTIVE AND OBJECTIVE BOX
[facility] Division of Hospital Medicine  Marc Kong MD  In House Pager [phone]    Patient is a 49y old  Female who presents with a chief complaint of suicide attempt, vertebral artery dissection (03 Oct 2020 13:17)      SUBJECTIVE / OVERNIGHT EVENTS:  No overnight events. Patient seen and examined at bedside, labs and vitals reviewed.  no new complaints today. wants to go home.   No fever, no chills, no SOB, no CP, no n/v/d, no abd pain, no dysuria      MEDICATIONS  (STANDING):  influenza   Vaccine 0.5 milliLiter(s) IntraMuscular once    MEDICATIONS  (PRN):  LORazepam     Tablet 1 milliGRAM(s) Oral every 4 hours PRN Anxiety  LORazepam   Injectable 1 milliGRAM(s) IV Push every 4 hours PRN Anxiety  LORazepam   Injectable 1 milliGRAM(s) IntraMuscular every 4 hours PRN Anxiety    CAPILLARY BLOOD GLUCOSE        I&O's Summary      PHYSICAL EXAM:  Vital Signs Last 24 Hrs  T(C): 36.7 (04 Oct 2020 07:00), Max: 36.7 (03 Oct 2020 15:22)  T(F): 98 (04 Oct 2020 07:00), Max: 98 (03 Oct 2020 15:22)  HR: 82 (04 Oct 2020 07:00) (81 - 89)  BP: 131/82 (04 Oct 2020 07:00) (130/85 - 133/71)  BP(mean): --  RR: 16 (04 Oct 2020 07:00) (16 - 18)  SpO2: 98% (04 Oct 2020 07:00) (97% - 99%)    Gen: NAD; resting in bed.  Pulm: no respiratory distress; CTA b/l; no wheezing  Cards: RRR, nl S1/S2; no obvious murmurs  Abd: soft; NT on exam  Ext: no cyanosis; no edema  Skin: no rash; no cyanosis    LABS:                        13.6   7.32  )-----------( 284      ( 03 Oct 2020 07:10 )             41.5     10-03    139  |  103  |  14  ----------------------------<  102<H>  4.1   |  24  |  0.55    Ca    9.2      03 Oct 2020 07:10  Phos  3.6     10-03  Mg     2.1     10-03      PT/INR - ( 02 Oct 2020 10:45 )   PT: 11.3 SEC;   INR: 0.99          PTT - ( 02 Oct 2020 10:45 )  PTT:26.1 SEC            RADIOLOGY & ADDITIONAL TESTS:  Results Reviewed: Y  Imaging Personally Reviewed: Y  Electrocardiogram Personally Reviewed: Y    COORDINATION OF CARE:  Care Discussed with Consultants/Other Providers [Y/N]: Y  Prior or Outpatient Records Reviewed [Y/N]: Y

## 2020-10-04 NOTE — PROGRESS NOTE BEHAVIORAL HEALTH - NSBHFUPINTERVALHXFT_PSY_A_CORE
Spoke to patient with PI , denies SI/I/P today, tries to minimize her suicide attempt, told that she must be admitted to [facility]

## 2020-10-04 NOTE — PROGRESS NOTE ADULT - PROBLEM SELECTOR PLAN 1
- intentional, seems to be stemming from financial hardships   - psych rec appreciated, high risk for suicide, would need further assessment, possible inpatient psych.  - maintain 1:1 - intentional, seems to be stemming from financial hardships   - psych rec appreciated, high risk for suicide, would need further assessment, need inpatient psych management at [facility]  - maintain 1:1  - medically clear for discharge to [facility].

## 2020-10-04 NOTE — PROGRESS NOTE BEHAVIORAL HEALTH - OTHER
patient repeatedly discusses concerns about losing job
patient repeatedly discusses concerns about losing job

## 2020-10-04 NOTE — PROGRESS NOTE BEHAVIORAL HEALTH - NSBHCHARTREVIEWVS_PSY_A_CORE FT
Vital Signs Last 24 Hrs  T(C): 36.7 (04 Oct 2020 07:00), Max: 36.7 (03 Oct 2020 15:22)  T(F): 98 (04 Oct 2020 07:00), Max: 98 (03 Oct 2020 15:22)  HR: 82 (04 Oct 2020 07:00) (81 - 89)  BP: 131/82 (04 Oct 2020 07:00) (130/85 - 133/71)  BP(mean): --  RR: 16 (04 Oct 2020 07:00) (16 - 18)  SpO2: 98% (04 Oct 2020 07:00) (97% - 99%)

## 2020-10-04 NOTE — PROGRESS NOTE BEHAVIORAL HEALTH - NSBHCHARTREVIEWLAB_PSY_A_CORE FT
13.6   7.32  )-----------( 284      ( 03 Oct 2020 07:10 )             41.5     10-03    139  |  103  |  14  ----------------------------<  102<H>  4.1   |  24  |  0.55    Ca    9.2      03 Oct 2020 07:10  Phos  3.6     10-03  Mg     2.1     10-03

## 2020-10-05 ENCOUNTER — INPATIENT (INPATIENT)
Facility: HOSPITAL | Age: 50
LOS: 6 days | Discharge: HOME | End: 2020-10-12
Attending: PSYCHIATRY & NEUROLOGY | Admitting: PSYCHIATRY & NEUROLOGY
Payer: COMMERCIAL

## 2020-10-05 VITALS
SYSTOLIC BLOOD PRESSURE: 124 MMHG | DIASTOLIC BLOOD PRESSURE: 79 MMHG | HEART RATE: 84 BPM | TEMPERATURE: 98 F | OXYGEN SATURATION: 99 % | RESPIRATION RATE: 18 BRPM

## 2020-10-05 VITALS — RESPIRATION RATE: 18 BRPM | HEIGHT: 64.17 IN | TEMPERATURE: 98 F | WEIGHT: 110.89 LBS

## 2020-10-05 DIAGNOSIS — F33.2 MAJOR DEPRESSIVE DISORDER, RECURRENT SEVERE WITHOUT PSYCHOTIC FEATURES: ICD-10-CM

## 2020-10-05 PROBLEM — Z78.9 OTHER SPECIFIED HEALTH STATUS: Chronic | Status: ACTIVE | Noted: 2020-10-02

## 2020-10-05 PROCEDURE — 99233 SBSQ HOSP IP/OBS HIGH 50: CPT

## 2020-10-05 PROCEDURE — 99223 1ST HOSP IP/OBS HIGH 75: CPT

## 2020-10-05 PROCEDURE — 99239 HOSP IP/OBS DSCHRG MGMT >30: CPT

## 2020-10-05 RX ORDER — MIRTAZAPINE 45 MG/1
15 TABLET, ORALLY DISINTEGRATING ORAL AT BEDTIME
Refills: 0 | Status: DISCONTINUED | OUTPATIENT
Start: 2020-10-05 | End: 2020-10-12

## 2020-10-05 RX ORDER — INFLUENZA VIRUS VACCINE 15; 15; 15; 15 UG/.5ML; UG/.5ML; UG/.5ML; UG/.5ML
0.5 SUSPENSION INTRAMUSCULAR ONCE
Refills: 0 | Status: COMPLETED | OUTPATIENT
Start: 2020-10-05 | End: 2020-10-06

## 2020-10-05 RX ADMIN — MIRTAZAPINE 15 MILLIGRAM(S): 45 TABLET, ORALLY DISINTEGRATING ORAL at 20:52

## 2020-10-05 NOTE — PROGRESS NOTE BEHAVIORAL HEALTH - THOUGHT ASSOCIATIONS
CLINICAL NUTRITION SERVICES - PEDIATRIC TELEPHONE/EMAIL NOTE    Received phone call from Mom as Claudia is taking very little Neocate [id number] Splash and prefers the Neocate Jr. Chocolate flavor.  Mom needs new WIC form - will fax in new form for only Neocate Jr. Chocolate with goal of 16-20 oz/day.    Gretel Christopher RD, LD  Pager # [phone]    
Normal

## 2020-10-05 NOTE — DISCHARGE NOTE PROVIDER - NSDCCPCAREPLAN_GEN_ALL_CORE_FT
PRINCIPAL DISCHARGE DIAGNOSIS  Diagnosis: Suicide attempt by hanging, initial encounter  Assessment and Plan of Treatment: You were admitted to the hospital after suicide attempt. You were placed on one-to-one observation. You were seen by psyciatry who recommended you be transferred to [facility] for further treatment.      SECONDARY DISCHARGE DIAGNOSES  Diagnosis: Episode of loss of consciousness  Assessment and Plan of Treatment: You had a CT angiogram of the neck which showed mild irregularity of the right V3 segment raising the possibility of a focal dissection. You then had an MR head and MR neck, which showed tiny subcentimeter foci of T2 prolongation in the periventricular white matter region is identified, could be related to migraine headaches though underlying areas of ischemia infection inflammation or demyelinating disease (less likely) cannot be entirely excluded. No significant stenosis or dissection is identified. You were seen by neurology and neurosurgery, who deemed no interventions neccesary.

## 2020-10-05 NOTE — PROGRESS NOTE BEHAVIORAL HEALTH - NS ED [facility] REVIEW OF ED CHART AVAILABLE IMAGING REVIEWED
abd pain x2 days -states hx of crohn and diverticulitis - diarrhea started today also c/o vomiting
Yes

## 2020-10-05 NOTE — PROGRESS NOTE ADULT - SUBJECTIVE AND OBJECTIVE BOX
[facility] Division of Hospital Medicine  Josh Silva MD  Pager [phone]      Patient is a 49y old  Female who presents with a chief complaint of suicide attempt, vertebral artery dissection (05 Oct 2020 10:49)      SUBJECTIVE / OVERNIGHT EVENTS: Denies current suicidal ideation.     MEDICATIONS  (STANDING):  influenza   Vaccine 0.5 milliLiter(s) IntraMuscular once    MEDICATIONS  (PRN):  LORazepam     Tablet 1 milliGRAM(s) Oral every 4 hours PRN Anxiety  LORazepam   Injectable 1 milliGRAM(s) IV Push every 4 hours PRN Anxiety  LORazepam   Injectable 1 milliGRAM(s) IntraMuscular every 4 hours PRN Anxiety      CAPILLARY BLOOD GLUCOSE        I&O's Summary      PHYSICAL EXAM:  Vital Signs Last 24 Hrs  T(C): 36.8 (05 Oct 2020 01:00), Max: 36.8 (05 Oct 2020 01:00)  T(F): 98.3 (05 Oct 2020 01:00), Max: 98.3 (05 Oct 2020 01:00)  HR: 86 (05 Oct 2020 01:00) (81 - 86)  BP: 128/84 (05 Oct 2020 01:00) (126/80 - 128/84)  BP(mean): --  RR: 17 (05 Oct 2020 01:00) (16 - 17)  SpO2: 99% (05 Oct 2020 01:00) (99% - 99%)  CONSTITUTIONAL: NAD  EYES: conjunctiva and sclera clear  ENMT: mmm  NECK: Supple,  RESPIRATORY: Normal respiratory effort; lungs are clear to auscultation bilaterally  CARDIOVASCULAR: Regular rate and rhythm, + S1 and S2  ABDOMEN: Nontender to palpation, normoactive bowel sounds, no rebound/guarding  MUSCULOSKELETAL:  no clubbing or cyanosis of digits;   PSYCH: A+O x 3  NEUROLOGY: no gross deficits   SKIN: No rashes;     LABS:                          COORDINATION OF CARE:  Care Discussed with Consultants/Other Providers [Y/N]: psych ( Dr. Carter)

## 2020-10-05 NOTE — PROGRESS NOTE BEHAVIORAL HEALTH - NSBHADMITIPOBSFT_PSY_A_CORE
constant observation while in medical hospital, likely routine observation at [facility]

## 2020-10-05 NOTE — DISCHARGE NOTE PROVIDER - HOSPITAL COURSE
Ms Lucero is a 49 year old female with no known PMH who is presenting to the hospital after being found this morning hanging unconscious in her back yard from a jump rope with CT findings concerning for possible vertebral artery dissection. Further imaging with MRA which were negative for dissection.      Suicide Attempt    - Pt was placed on 1:1  -intentional, seems to be stemming from financial hardships   - Psych rec appreciated, high risk for suicide, would need further assessment, need inpatient psych management at [facility]  - Pt to be discharged to [facility].       Problem/Plan - 2:  ·  Problem: R/O Vertebral artery dissection.    -CT angio neck: showed mild irregularity of the right V3 segment raising the possibility of a focal dissection  - MR head and MR neck : Tiny subcentimeter foci of T2 prolongation in the periventricular white matter region is identified, could be related to migraine headaches though underlying areas of ischemia infection inflammation or demyelinating disease (less likely) cannot be entirely excluded. Clinical correlation is recommended. No significant stenosis or dissection is identified.      Ms Lucero is a 49 year old female with no known PMH who is presenting to the hospital after being found this morning hanging unconscious in her back yard from a jump rope with CT findings concerning for possible vertebral artery dissection. Further imaging with MRA which were negative for dissection.      Suicide Attempt    - Pt was placed on 1:1  -intentional, seems to be stemming from financial hardships   - Psych rec appreciated, high risk for suicide, would need further assessment, need inpatient psych management at [facility]  - Pt to be discharged to [facility].       R/O Vertebral Artery Dissection   -Seen by Neuro and Neurosurgery, no interventions needed.    -CT angio neck: showed mild irregularity of the right V3 segment raising the possibility of a focal dissection  - MR head and MR neck : Tiny subcentimeter foci of T2 prolongation in the periventricular white matter region is identified, could be related to migraine headaches though underlying areas of ischemia infection inflammation or demyelinating disease (less likely) cannot be entirely excluded. Clinical correlation is recommended. No significant stenosis or dissection is identified.     On 10/5/2020, discussed with Dr. Silva, patient is medically cleared and optimized for discharge today. All medications were reviewed with attending, and sent to mutually agreed upon pharmacy.

## 2020-10-05 NOTE — PROGRESS NOTE ADULT - REASON FOR ADMISSION
suicide attempt, vertebral artery dissection

## 2020-10-05 NOTE — PROGRESS NOTE BEHAVIORAL HEALTH - SECONDARY DX1
Suicide attempt by hanging, initial encounter

## 2020-10-05 NOTE — PROGRESS NOTE BEHAVIORAL HEALTH - NSBHFUPINTERVALHXFT_PSY_A_CORE
Spoke to patient with Turkish  [id number]. Patient states that she feels better today and she understands that she needs to go to a psychiatric hospital for initiation of treatment. She denies suicidal ideation, intent, or plan. She denies homicidal, violent, or aggressive ideation, intent, or plan. She states that she thought about her mood changes and her suicide attempt over the weekend and thinks that she has been having mood changes due to menopause. She is agreeable with receiving psychiatric treatment at [facility] and asked about visiting hours.    Discussed with patient plan to transfer to [facility]; gave handoff to provider at [facility].

## 2020-10-05 NOTE — PROGRESS NOTE BEHAVIORAL HEALTH - RISK ASSESSMENT
Pt is an acute high risk for suicide given her recent attempt, impulsive behavior, acute anxiety, poor insight, and poor judgment. Her protective factors include being , having children, and being employed, and having no prior suicide attempts.
Pt is an acute high risk for suicide given her recent attempt, impulsive behavior, acute anxiety, poor insight, and poor judgment. Her protective factors include being , having children, and being employed, and having no prior suicide attempts.
Pt is at intermediate acute risk for suicide given her recent attempt, impulsive behavior, acute anxiety. Her protective factors include being , having children, being employed, having no prior suicide attempts, no prior psychiatric history, and patient expresses regret about suicide attempt.

## 2020-10-05 NOTE — PROGRESS NOTE BEHAVIORAL HEALTH - SUMMARY
This is a 48 y/o  Japanese speaking female who is domiciled with her  and children, who is employed as a , who denies any prior psychiatric treatment, no prior suicide attempts, who was BIBEMS after she had a suicide attempt via hanging herself with a rope. Pt reportedly lost consciousness after the attempt but was found by her . Pt states that she has been depressed since the pandemic started and has been having financial problems. She states the attempt was impulsive and is minimizing the severity oft he attempt (there was question of vertebral artery dissection on MRA). Pt requires inpatient psychiatric hospitalization at this time.
This is a 50 y/o  Italian speaking female who is domiciled with her  and children, who is employed as a , who denies any prior psychiatric treatment, no prior suicide attempts, who was BIBEMS after she had a suicide attempt via hanging herself with a rope. Pt reportedly lost consciousness after the attempt but was found by her . Pt states that she has been depressed since the pandemic started and has been having financial problems. She states the attempt was impulsive and is minimizing the severity oft he attempt (there was question of vertebral artery dissection on MRA). Pt requires inpatient psychiatric hospitalization at this time.    10/4 - limited insight, minimizes her severe suicide attempt, needs [facility] admission
This is a 50 y/o  Irish speaking female who is domiciled with her  and children, who is employed as a , who denies any prior psychiatric treatment, no prior suicide attempts, who was BIBEMS after she had a suicide attempt via hanging herself with a rope. Pt reportedly lost consciousness after the attempt but was found by her . Pt states that she has been depressed since the pandemic started and has been having financial problems. She states the attempt was impulsive and is minimizing the severity oft he attempt (there was question of vertebral artery dissection on MRA). Pt requires inpatient psychiatric hospitalization at this time.    10/5 - Somewhat improved insight, understands need for admission and treatment. No S/H/V/I/I/P.

## 2020-10-05 NOTE — PROGRESS NOTE BEHAVIORAL HEALTH - NSBHCHARTREVIEWINVESTIGATE_PSY_A_CORE FT
< from: 12 Lead ECG (10.02.20 @ 06:37) >    QTC Calculation(Bazett) 425 ms    < end of copied text >
QTc 425 ms
< from: 12 Lead ECG (10.02.20 @ 06:37) >  QTC Calculation(Bazett) 425 ms

## 2020-10-05 NOTE — PROGRESS NOTE BEHAVIORAL HEALTH - CASE SUMMARY
Chart reviewed. I personally saw and examined the patient with Dr. Kearney. I agree with his history, MSE, A/P. Per medicine team, further workup for disection was negative. Now medically cleared. While patient denies SI now, and seems remorseful over her attempt, given the severity of actual attempt, coupled with poor insight initially and minimization of situation, I continue to believe that she warrants inpatient psychiatry as the least restrictive means where further observation, evaluation and treatment can be done safely. Will not start medication today as she will be transferred to inpatient unit. Will recommend hospitalist consult once there.

## 2020-10-05 NOTE — PROGRESS NOTE BEHAVIORAL HEALTH - PRIMARY DX
Current severe episode of major depressive disorder without psychotic features without prior episode

## 2020-10-05 NOTE — DISCHARGE NOTE NURSING/CASE MANAGEMENT/SOCIAL WORK - PATIENT PORTAL LINK FT
You can access the FollowMyHealth Patient Portal offered by [facility] by registering at the following website: [email or web address]. By joining [facility]’s FollowMyHealth portal, you will also be able to view your health information using other applications (apps) compatible with our system.

## 2020-10-05 NOTE — PROGRESS NOTE ADULT - PROBLEM SELECTOR PLAN 1
- intentional, seems to be stemming from financial hardships   - psych rec appreciated, high risk for suicide, would need further assessment, need inpatient psych management at [facility]  - maintain 1:1  - medically cleared for discharge to [facility].

## 2020-10-05 NOTE — PROGRESS NOTE BEHAVIORAL HEALTH - NSBHCHARTREVIEWVS_PSY_A_CORE FT
T(C): 36.8 (05 Oct 2020 01:00), Max: 36.8 (05 Oct 2020 01:00)  T(F): 98.3 (05 Oct 2020 01:00), Max: 98.3 (05 Oct 2020 01:00)  HR: 86 (05 Oct 2020 01:00) (81 - 86)  BP: 128/84 (05 Oct 2020 01:00) (126/80 - 128/84)  RR: 17 (05 Oct 2020 01:00) (16 - 17)  SpO2: 99% (05 Oct 2020 01:00) (99% - 99%)

## 2020-10-06 LAB
CHOLEST SERPL-MCNC: 173 MG/DL — SIGNIFICANT CHANGE UP (ref 120–199)
HDLC SERPL-MCNC: 82 MG/DL — HIGH (ref 45–65)
LIPID PNL WITH DIRECT LDL SERPL: 86 MG/DL — SIGNIFICANT CHANGE UP
T PALLIDUM AB TITR SER: NEGATIVE — SIGNIFICANT CHANGE UP
T3 SERPL-MCNC: 85.3 NG/DL — SIGNIFICANT CHANGE UP (ref 80–200)
T4 AB SER-ACNC: 7.29 UG/DL — SIGNIFICANT CHANGE UP (ref 5.1–13)
TRIGL SERPL-MCNC: 104 MG/DL — SIGNIFICANT CHANGE UP (ref 10–149)
TSH SERPL-MCNC: 3.4 UIU/ML — SIGNIFICANT CHANGE UP (ref 0.27–4.2)

## 2020-10-06 PROCEDURE — 99222 1ST HOSP IP/OBS MODERATE 55: CPT

## 2020-10-06 PROCEDURE — 99232 SBSQ HOSP IP/OBS MODERATE 35: CPT

## 2020-10-06 RX ORDER — INFLUENZA VIRUS VACCINE 15; 15; 15; 15 UG/.5ML; UG/.5ML; UG/.5ML; UG/.5ML
0.5 SUSPENSION INTRAMUSCULAR ONCE
Refills: 0 | Status: DISCONTINUED | OUTPATIENT
Start: 2020-10-06 | End: 2020-10-06

## 2020-10-06 RX ORDER — SERTRALINE 25 MG/1
25 TABLET, FILM COATED ORAL DAILY
Refills: 0 | Status: DISCONTINUED | OUTPATIENT
Start: 2020-10-06 | End: 2020-10-08

## 2020-10-06 RX ADMIN — SERTRALINE 25 MILLIGRAM(S): 25 TABLET, FILM COATED ORAL at 09:51

## 2020-10-06 RX ADMIN — MIRTAZAPINE 15 MILLIGRAM(S): 45 TABLET, ORALLY DISINTEGRATING ORAL at 21:17

## 2020-10-06 RX ADMIN — INFLUENZA VIRUS VACCINE 0.5 MILLILITER(S): 15; 15; 15; 15 SUSPENSION INTRAMUSCULAR at 16:49

## 2020-10-06 NOTE — CONSULT NOTE ADULT - ASSESSMENT
50 yo F admitted to [facility] for further psychiatric management following a suicide attempt via hanging (w/ jump rope).     # Suicide attempt, MDD  - currently denies SI to me, imaging of neck negative for vertebral artery dissection, currently denies pain, trouble swallowing/speaking or breathing  - safety precautions and management as per psych  - currently on zoloft daily, remeron qhs, w/ ativan prn anxiety  - emotional support provided    DVT ppx: low risk, ambulating

## 2020-10-06 NOTE — CONSULT NOTE ADULT - SUBJECTIVE AND OBJECTIVE BOX
Patient offered and declined Portuguese phone  services.     HPI: 48 yo F with no significant PMH/PSH who initially presented to [facility] following a suicide attempt (was found hanging unconscious in her back yard from a jump rope), imaging (CTA) was concerning for possible vertebral artery dissection (irregularity seen in the R V3 segment) however further imaging (MRA) was negative and that patient was cleared for transfer to [facility] for further psychiatric management.   ]  Patient currently without focal complaints however is reporting some difficulty adapting to the new environment. She denies any current SI to me, and denies neck pain/trouble swallowing or speaking.     PAST MEDICAL & SURGICAL HISTORY:  No pertinent past medical history    No significant past surgical history    Review of Systems:   CONSTITUTIONAL: No fever/chills  EYES: No eye pain or blurry vision  ENMT:  No throat pain or trouble swallowing/speaking.   NECK: No neck pain or stiffness  RESPIRATORY: No cough or shortness of breath  CARDIOVASCULAR: No chest pain or palpitations  GASTROINTESTINAL: No abdominal pain. No nausea, vomiting.  GENITOURINARY: No dysuria or frequency  NEUROLOGICAL: No headaches or focal weakness/numbness  SKIN: No rash or lesions  ENDOCRINE: No diabetes or thyroid disease  MUSCULOSKELETAL: No joint pain or swelling  HEME/LYMPH: No easy bleeding or bruising  ALLERGY AND IMMUNOLOGIC: NKDA    Allergies  No Known Allergies    Intolerances    Social History:   Lives with family, , denies toxic habits    FAMILY HISTORY:  Reports father had CVA    MEDICATIONS  (STANDING):  influenza   Vaccine 0.5 milliLiter(s) IntraMuscular once  mirtazapine 15 milliGRAM(s) Oral at bedtime  sertraline 25 milliGRAM(s) Oral daily    MEDICATIONS  (PRN):  LORazepam     Tablet 0.5 milliGRAM(s) Oral every 6 hours PRN anxiety    Vital Signs Last 24 Hrs  T(C): 36.5 (06 Oct 2020 08:46), Max: 36.7 (05 Oct 2020 13:15)  T(F): 97.7 (06 Oct 2020 08:46), Max: 98 (05 Oct 2020 13:15)  HR: 98 (05 Oct 2020 21:05) (84 - 98)  BP: 124/79 (05 Oct 2020 13:15) (124/79 - 124/79)  BP(mean): --  RR: 18 (06 Oct 2020 08:46) (17 - 18)  SpO2: 99% (05 Oct 2020 13:15) (99% - 99%)  CAPILLARY BLOOD GLUCOSE        PHYSICAL EXAM:  GENERAL: NAD, adult female (), ambulating around unit  HEAD:  Atraumatic, Normocephalic  EYES: EOMI, conjunctiva and sclera clear  NECK: Supple, No JVD, horizontal scar appears to be healing  CHEST/LUNG: Clear to auscultation bilaterally, comfortable on RA, speaking in full sentences  HEART: S1S2 Regular rate and rhythm; No murmurs, rubs, or gallops  ABDOMEN: Soft, Nontender, Nondistended; Bowel sounds present  EXTREMITIES: No clubbing, cyanosis, or edema  PSYCH: pleasant, denies SI to me, slightly anxious appearing  NEUROLOGY: non-focal, moving all extremities, SILT grossly  SKIN: No other rashes/lesions noted    LABS:  Basic Metabolic Panel w/Mg & Inorg Phos (10.03.20 @ 07:10)   Sodium, Serum: 139 mmol/L   Potassium, Serum: 4.1 mmol/L   Chloride, Serum: 103: Delta: 98 on 10/02/   Delta: 98 on 10/02/ mmol/L   Carbon Dioxide, Serum: 24 mmol/L   Anion Gap, Serum: 12 mmo/L   Blood Urea Nitrogen, Serum: 14 mg/dL   Creatinine, Serum: 0.55 mg/dL   Glucose, Serum: 102 mg/dL   Calcium, Total Serum: 9.2 mg/dL   Magnesium, Serum: 2.1 mg/dL   Phosphorus Level, Serum: 3.6 mg/dL   `Complete Blood Count in AM (10.03.20 @ 07:10)   WBC Count: 7.32 K/uL   RBC Count: 4.40 M/uL   Hemoglobin: 13.6 g/dL   Hematocrit: 41.5 %   Mean Cell Volume: 94.3 fL   Mean Cell Hemoglobin: 30.9 pg   Mean Cell Hemoglobin Conc: 32.8 %   Red Cell Distrib Width: 11.9 %   Platelet Count - Automated: 284 K/uL   MPV: 10.7 fl   Nucleated RBC #: 0 K/uL     EKG(personally reviewed): 10/2 NSR 87 bpm    RADIOLOGY & ADDITIONAL TESTS:    Imaging Personally Reviewed:  `< from: MR Angio Neck w/ IV Cont (10.02.20 @ 16:37) >  INTERPRETATION:  Clinical indication: Asphyxiation due to positioning. Interventional self-harm.    MRI of the brain was performed using sagittal T1, axial T1-T2 T2 FLAIR diffusion and susceptibility weighted sequence was performed.    MRA of the neck was formed using 2-D and 3-D time flight technique. Axial T1-weighted sequence with fat suppression was performed through the soft tissue neck. Gadolinium infusion MRA of the neck was performed.    The lateral ventricles have a normal configuration.    Tiny subcentimeter foci of T2 prolongation in the periventricular white matter region is identified. These findings could be related to migraine headaches though underlying areas of ischemia infection inflammation or demyelinating disease (less likely) cannot be entirely excluded. Clinical correlation is recommended. Continued close interval follow-up imaging can be done for further evaluation if clinically indicated.    Cavum septum pellucidum and vergae is seen.    There is no evidence acute hemorrhage mass or mass effect in the posterior fossa or supratentorial region.    Evaluation of the diffusion weighted sequence demonstrates no abnormal areasof restricted diffusion to suggest acute infarct.    There are no abnormal intra or extra axial axial collection seen.    The large vessels demonstrate normal flow voids.    The visualized sinuses mastoid and middle ear regions appear clear.    Both distal common carotid, proximal internal and external carotid arteries appear normal as well as both vertebral arteries. No significant stenosis or dissection is identified.    IMPRESSION: Subcentimeter foci of T2 prolongation in the periventricular white matter region as described above    Unremarkable MRA of the neck.    < end of copied text >    Consultant(s) Notes Reviewed:  [facility] course, Nsx, med.  notes    Care Discussed with Consultants/Other Providers: JOSSY STEWART

## 2020-10-07 PROCEDURE — 99232 SBSQ HOSP IP/OBS MODERATE 35: CPT

## 2020-10-07 RX ADMIN — SERTRALINE 25 MILLIGRAM(S): 25 TABLET, FILM COATED ORAL at 08:58

## 2020-10-08 LAB
SARS-COV-2 IGG SERPL IA-ACNC: 0.3 RATIO — SIGNIFICANT CHANGE UP
SARS-COV-2 IGG SERPL QL IA: NEGATIVE — SIGNIFICANT CHANGE UP
SARS-COV-2 IGG SERPL QL IA: NEGATIVE — SIGNIFICANT CHANGE UP
SARS-COV-2 IGM SERPL IA-ACNC: 0.26 RATIO — SIGNIFICANT CHANGE UP

## 2020-10-08 PROCEDURE — 99232 SBSQ HOSP IP/OBS MODERATE 35: CPT

## 2020-10-08 RX ORDER — SERTRALINE 25 MG/1
50 TABLET, FILM COATED ORAL DAILY
Refills: 0 | Status: DISCONTINUED | OUTPATIENT
Start: 2020-10-09 | End: 2020-10-12

## 2020-10-08 RX ADMIN — SERTRALINE 25 MILLIGRAM(S): 25 TABLET, FILM COATED ORAL at 08:54

## 2020-10-08 RX ADMIN — MIRTAZAPINE 15 MILLIGRAM(S): 45 TABLET, ORALLY DISINTEGRATING ORAL at 06:35

## 2020-10-08 RX ADMIN — MIRTAZAPINE 15 MILLIGRAM(S): 45 TABLET, ORALLY DISINTEGRATING ORAL at 22:19

## 2020-10-09 PROCEDURE — 99232 SBSQ HOSP IP/OBS MODERATE 35: CPT

## 2020-10-09 RX ORDER — MIRTAZAPINE 45 MG/1
1 TABLET, ORALLY DISINTEGRATING ORAL
Qty: 15 | Refills: 0
Start: 2020-10-09 | End: 2020-10-23

## 2020-10-09 RX ORDER — SERTRALINE 25 MG/1
1 TABLET, FILM COATED ORAL
Qty: 15 | Refills: 0
Start: 2020-10-09 | End: 2020-10-23

## 2020-10-09 RX ADMIN — SERTRALINE 50 MILLIGRAM(S): 25 TABLET, FILM COATED ORAL at 08:10

## 2020-10-09 RX ADMIN — MIRTAZAPINE 15 MILLIGRAM(S): 45 TABLET, ORALLY DISINTEGRATING ORAL at 21:25

## 2020-10-10 PROCEDURE — 99231 SBSQ HOSP IP/OBS SF/LOW 25: CPT

## 2020-10-10 RX ADMIN — SERTRALINE 50 MILLIGRAM(S): 25 TABLET, FILM COATED ORAL at 09:52

## 2020-10-10 RX ADMIN — MIRTAZAPINE 15 MILLIGRAM(S): 45 TABLET, ORALLY DISINTEGRATING ORAL at 22:31

## 2020-10-11 RX ADMIN — MIRTAZAPINE 15 MILLIGRAM(S): 45 TABLET, ORALLY DISINTEGRATING ORAL at 23:28

## 2020-10-11 RX ADMIN — SERTRALINE 50 MILLIGRAM(S): 25 TABLET, FILM COATED ORAL at 09:38

## 2020-10-12 VITALS — TEMPERATURE: 98 F | RESPIRATION RATE: 18 BRPM

## 2020-10-12 PROCEDURE — 99238 HOSP IP/OBS DSCHRG MGMT 30/<: CPT

## 2020-10-12 RX ADMIN — SERTRALINE 50 MILLIGRAM(S): 25 TABLET, FILM COATED ORAL at 08:20

## 2025-01-17 NOTE — ED PROVIDER NOTE - CARE PLAN
Principal Discharge DX:	Suicide attempt by hanging, initial encounter   details… soft/nontender/nondistended